# Patient Record
Sex: MALE | Race: WHITE | NOT HISPANIC OR LATINO | URBAN - METROPOLITAN AREA
[De-identification: names, ages, dates, MRNs, and addresses within clinical notes are randomized per-mention and may not be internally consistent; named-entity substitution may affect disease eponyms.]

---

## 2024-10-14 ENCOUNTER — OFFICE VISIT (OUTPATIENT)
Dept: URGENT CARE | Facility: CLINIC | Age: 49
End: 2024-10-14

## 2024-10-14 VITALS
RESPIRATION RATE: 20 BRPM | OXYGEN SATURATION: 99 % | SYSTOLIC BLOOD PRESSURE: 127 MMHG | DIASTOLIC BLOOD PRESSURE: 72 MMHG | TEMPERATURE: 98 F | HEART RATE: 80 BPM

## 2024-10-14 DIAGNOSIS — J06.9 VIRAL URI WITH COUGH: Primary | ICD-10-CM

## 2024-10-14 DIAGNOSIS — R05.1 ACUTE COUGH: ICD-10-CM

## 2024-10-14 LAB
CTP QC/QA: YES
SARS-COV-2 AG RESP QL IA.RAPID: NEGATIVE

## 2024-10-14 PROCEDURE — 99203 OFFICE O/P NEW LOW 30 MIN: CPT | Mod: TIER,S$GLB,, | Performed by: NURSE PRACTITIONER

## 2024-10-14 PROCEDURE — 87811 SARS-COV-2 COVID19 W/OPTIC: CPT | Mod: QW,TIER,S$GLB, | Performed by: NURSE PRACTITIONER

## 2024-10-14 RX ORDER — ALBUTEROL SULFATE 90 UG/1
2 INHALANT RESPIRATORY (INHALATION) EVERY 6 HOURS PRN
Qty: 18 G | Refills: 0 | Status: SHIPPED | OUTPATIENT
Start: 2024-10-14 | End: 2025-10-14

## 2024-10-14 RX ORDER — FLUTICASONE PROPIONATE 50 MCG
1 SPRAY, SUSPENSION (ML) NASAL DAILY
Qty: 18.2 ML | Refills: 0 | Status: SHIPPED | OUTPATIENT
Start: 2024-10-14 | End: 2024-10-21

## 2024-10-14 RX ORDER — BENZONATATE 200 MG/1
200 CAPSULE ORAL 3 TIMES DAILY PRN
Qty: 30 CAPSULE | Refills: 0 | Status: SHIPPED | OUTPATIENT
Start: 2024-10-14 | End: 2024-10-24

## 2024-10-14 NOTE — PROGRESS NOTES
Subjective:      Patient ID: Fabián Mcleod is a 49 y.o. male.    Vitals:  temperature is 97.9 °F (36.6 °C). His blood pressure is 127/72 and his pulse is 80. His respiration is 20 and oxygen saturation is 99%.     Chief Complaint: Cough    Pt presents with complaint of cough, fatigue, chest tightness.  Pt states his symptoms started this morning.  Pt states he has used nasal sprays and decongestants this morning for symptoms.  Pt deneis any known exposures to covid or flu.  Provider note begins below    Pt had similar illness in February that lasted about 3 weeks.  Had testing, labs and x rays that did not reveal any significant concerning findings.  He was treated with steroids and antibiotics without significant relief.  No fevers.  Has had some body aches.  Took zyrtec d today.  Takes a daily nasal spray.     Cough  This is a new problem. The current episode started today. The problem has been unchanged. The problem occurs every few minutes. The cough is Non-productive. Associated symptoms include postnasal drip and a sore throat. Pertinent negatives include no chest pain or fever. Nothing aggravates the symptoms. Treatments tried: nasal spray. The treatment provided no relief.       Constitution: Positive for fatigue. Negative for sweating and fever.   HENT:  Positive for congestion, postnasal drip and sore throat. Negative for trouble swallowing and voice change.    Cardiovascular:  Negative for chest pain and sob on exertion.   Respiratory:  Positive for chest tightness and cough.    Gastrointestinal:  Negative for nausea, vomiting, constipation and diarrhea.      Objective:     Physical Exam   Constitutional: He is oriented to person, place, and time.   HENT:   Head: Normocephalic and atraumatic.   Ears:   Right Ear: Tympanic membrane, external ear and ear canal normal.   Left Ear: Tympanic membrane, external ear and ear canal normal.   Nose: Rhinorrhea and purulent discharge present. No congestion.    Mouth/Throat: No oropharyngeal exudate or posterior oropharyngeal erythema.   Post nasal drip      Comments: Post nasal drip  Cardiovascular: Normal rate, regular rhythm and normal heart sounds.   Pulmonary/Chest: Effort normal and breath sounds normal. No stridor. No respiratory distress. He has no wheezes. He has no rhonchi. He has no rales. He exhibits no tenderness.   Abdominal: Normal appearance.   Neurological: He is alert and oriented to person, place, and time.   Skin: Skin is dry.   Psychiatric: His behavior is normal. Mood normal.         Results for orders placed or performed in visit on 10/14/24   SARS Coronavirus 2 Antigen, POCT Manual Read    Collection Time: 10/14/24  1:40 PM   Result Value Ref Range    SARS Coronavirus 2 Antigen Negative Negative     Acceptable Yes       Assessment:     1. Viral URI with cough    2. Acute cough        Plan:   Covid test negative  Likely viral  Zyrtec ok  Flonase and tessalon perrles  Follow up if not improving  Albuterol         Viral URI with cough  -     benzonatate (TESSALON) 200 MG capsule; Take 1 capsule (200 mg total) by mouth 3 (three) times daily as needed for Cough.  Dispense: 30 capsule; Refill: 0    Acute cough  -     SARS Coronavirus 2 Antigen, POCT Manual Read    Other orders  -     fluticasone propionate (FLONASE) 50 mcg/actuation nasal spray; 1 spray (50 mcg total) by Each Nostril route once daily. for 7 days  Dispense: 18.2 mL; Refill: 0  -     albuterol (VENTOLIN HFA) 90 mcg/actuation inhaler; Inhale 2 puffs into the lungs every 6 (six) hours as needed for Wheezing. Rescue  Dispense: 18 g; Refill: 0